# Patient Record
(demographics unavailable — no encounter records)

---

## 2025-07-09 NOTE — ASSESSMENT
[FreeTextEntry1] : IMPRESSION: 84F PMH of HTN, AFib on Eliquis, vertigo. On 6/21/25, she presented to [facility] with dizziness, unsteady gait, headache. Admitted for hypertensive urgency. 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  Patient presents today to the office with her daughter. Uses walker for assistance with ambulating. Counseled patient and family on the natural history of aneurysms, discussed risk of aneurysm rupture with the patient. The risk of aneurysm rupture is related to the size and location. Her aneurysm is considered moderately sized and the location is at a bit higher risk of rupture. Given her advanced age, it's reasonable to observe despite the relative elevated risks of the aneurysm. The aneurysm does appear amenable to endovascular coiling, but there is elevated risk of GETA and navigation through very torturous vessels. After extensive discussion with patient and daughter, they are hesitant to put her through embolization and want to discuss further with family. I explained that this is acceptable and risks of treatment vs risks of aneurysm natural history are likely equivocal.   Educated the patient on signs and symptoms of aneurysm rupture or subarachnoid hemorrhage. Should she have severe, sudden onset worst headache of her life, advised that she must seek medical attention immediately and go to the emergency room if this occurs.      PLAN: Repeat MRA head without contrast in 6 months from prior imaging - December 2025 Follow up after for review Daughter requests referral for ENT If her BP stays in 200s, recommend going to ER for evaluation

## 2025-07-09 NOTE — HISTORY OF PRESENT ILLNESS
[de-identified] : MANA AMAYA is an 84 year old female with PMH of HTN, AFib on Eliquis, vertigo. On 6/21/25, she presented to [facility] with dizziness, unsteady gait, headache. Admitted for hypertensive urgency. 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

## 2025-07-09 NOTE — ASSESSMENT
[FreeTextEntry1] : IMPRESSION: 84F PMH of HTN, AFib on Eliquis, vertigo. On 6/21/25, she presented to [facility] with dizziness, unsteady gait, headache. Admitted for hypertensive urgency. 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  Patient presents today to the office with her daughter. Uses walker for assistance with ambulating. Counseled patient and family on the natural history of aneurysms, discussed risk of aneurysm rupture with the patient. The risk of aneurysm rupture is related to the size and location. Her aneurysm is considered moderately sized and the location is at a bit higher risk of rupture. Given her advanced age, it's reasonable to observe despite the relative elevated risks of the aneurysm. The aneurysm does appear amenable to endovascular coiling, but there is elevated risk of GETA and navigation through very torturous vessels. After extensive discussion with patient and daughter, they are hesitant to put her through embolization and want to discuss further with family. I explained that this is acceptable and risks of treatment vs risks of aneurysm natural history are likely equivocal.   Educated the patient on signs and symptoms of aneurysm rupture or subarachnoid hemorrhage. Should she have severe, sudden onset worst headache of her life, advised that she must seek medical attention immediately and go to the emergency room if this occurs.      PLAN: Repeat MRA head without contrast in 6 months from prior imaging - December 2025 Follow up after for review Daughter requests referral for ENT If her BP stays in 200s, recommend going to ER for evaluation

## 2025-07-09 NOTE — HISTORY OF PRESENT ILLNESS
[de-identified] : MANA AMAYA is an 84 year old female with PMH of HTN, AFib on Eliquis, vertigo. On 6/21/25, she presented to [facility] with dizziness, unsteady gait, headache. Admitted for hypertensive urgency. 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